# Patient Record
Sex: MALE | Race: ASIAN | Employment: FULL TIME | URBAN - METROPOLITAN AREA
[De-identification: names, ages, dates, MRNs, and addresses within clinical notes are randomized per-mention and may not be internally consistent; named-entity substitution may affect disease eponyms.]

---

## 2017-12-27 ENCOUNTER — OFFICE VISIT (OUTPATIENT)
Dept: FAMILY MEDICINE | Facility: CLINIC | Age: 42
End: 2017-12-27
Payer: COMMERCIAL

## 2017-12-27 VITALS
HEART RATE: 61 BPM | TEMPERATURE: 96.3 F | HEIGHT: 70 IN | BODY MASS INDEX: 20.93 KG/M2 | DIASTOLIC BLOOD PRESSURE: 68 MMHG | WEIGHT: 146.2 LBS | SYSTOLIC BLOOD PRESSURE: 110 MMHG

## 2017-12-27 DIAGNOSIS — Z00.00 ENCOUNTER FOR ROUTINE ADULT HEALTH EXAMINATION WITHOUT ABNORMAL FINDINGS: ICD-10-CM

## 2017-12-27 DIAGNOSIS — Z13.6 CARDIOVASCULAR SCREENING; LDL GOAL LESS THAN 160: Primary | ICD-10-CM

## 2017-12-27 DIAGNOSIS — R22.1 MASS OF NECK: ICD-10-CM

## 2017-12-27 LAB
ANION GAP SERPL CALCULATED.3IONS-SCNC: 9 MMOL/L (ref 3–14)
BUN SERPL-MCNC: 13 MG/DL (ref 7–30)
CALCIUM SERPL-MCNC: 9.1 MG/DL (ref 8.5–10.1)
CHLORIDE SERPL-SCNC: 105 MMOL/L (ref 94–109)
CHOLEST SERPL-MCNC: 211 MG/DL
CO2 SERPL-SCNC: 24 MMOL/L (ref 20–32)
CREAT SERPL-MCNC: 0.81 MG/DL (ref 0.66–1.25)
GFR SERPL CREATININE-BSD FRML MDRD: >90 ML/MIN/1.7M2
GLUCOSE SERPL-MCNC: 81 MG/DL (ref 70–99)
HDLC SERPL-MCNC: 65 MG/DL
LDLC SERPL CALC-MCNC: 127 MG/DL
NONHDLC SERPL-MCNC: 146 MG/DL
POTASSIUM SERPL-SCNC: 3.9 MMOL/L (ref 3.4–5.3)
SODIUM SERPL-SCNC: 138 MMOL/L (ref 133–144)
TRIGL SERPL-MCNC: 97 MG/DL

## 2017-12-27 PROCEDURE — 99396 PREV VISIT EST AGE 40-64: CPT | Performed by: PHYSICIAN ASSISTANT

## 2017-12-27 PROCEDURE — 80048 BASIC METABOLIC PNL TOTAL CA: CPT | Performed by: PHYSICIAN ASSISTANT

## 2017-12-27 PROCEDURE — 80061 LIPID PANEL: CPT | Performed by: PHYSICIAN ASSISTANT

## 2017-12-27 PROCEDURE — 36415 COLL VENOUS BLD VENIPUNCTURE: CPT | Performed by: PHYSICIAN ASSISTANT

## 2017-12-27 NOTE — MR AVS SNAPSHOT
After Visit Summary   12/27/2017    Nicholas Curiel    MRN: [id number]           Patient Information     Date Of Birth          1975        Visit Information        Provider Department      12/27/2017 7:00 AM Mir Lentz PA-C [facility]        Today's Diagnoses     CARDIOVASCULAR SCREENING; LDL GOAL LESS THAN 160    -  1    Encounter for routine adult health examination without abnormal findings          Care Instructions      Preventive Health Recommendations  Male Ages 40 to 49    Yearly exam:             See your health care provider every year in order to  o   Review health changes.   o   Discuss preventive care.    o   Review your medicines if your doctor has prescribed any.    You should be tested each year for STDs (sexually transmitted diseases) if you re at risk.     Have a cholesterol test every 5 years.     Have a colonoscopy (test for colon cancer) if someone in your family has had colon cancer or polyps before age 50.     After age 45, have a diabetes test (fasting glucose). If you are at risk for diabetes, you should have this test every 3 years.      Talk with your health care provider about whether or not a prostate cancer screening test (PSA) is right for you.    Shots: Get a flu shot each year. Get a tetanus shot every 10 years.     Nutrition:    Eat at least 5 servings of fruits and vegetables daily.     Eat whole-grain bread, whole-wheat pasta and brown rice instead of white grains and rice.     Talk to your provider about Calcium and Vitamin D.     Lifestyle    Exercise for at least 150 minutes a week (30 minutes a day, 5 days a week). This will help you control your weight and prevent disease.     Limit alcohol to one drink per day.     No smoking.     Wear sunscreen to prevent skin cancer.     See your dentist every six months for an exam and cleaning.              Follow-ups after your visit        Follow-up notes from your care team  "    Return in about 1 year (around 12/27/2018) for Physical Exam.      Who to contact     If you have questions or need follow up information about today's clinic visit or your schedule please contact [facility] [place] directly at [phone].  Normal or non-critical lab and imaging results will be communicated to you by MyChart, letter or phone within 4 business days after the clinic has received the results. If you do not hear from us within 7 days, please contact the clinic through [employer] or phone. If you have a critical or abnormal lab result, we will notify you by phone as soon as possible.  Submit refill requests through [employer] or call your pharmacy and they will forward the refill request to us. Please allow 3 business days for your refill to be completed.          Additional Information About Your Visit        People[employer] Information     [employer] gives you secure access to your electronic health record. If you see a primary care provider, you can also send messages to your care team and make appointments. If you have questions, please call your primary care clinic.  If you do not have a primary care provider, please call [phone] and they will assist you.        Care EveryWhere ID     This is your Care EveryWhere ID. This could be used by other organizations to access your [place] medical records  [id number]        Your Vitals Were     Pulse Temperature Height BMI (Body Mass Index)          61 96.3  F (35.7  C) 5' 10\" (1.778 m) 20.98 kg/m2         Blood Pressure from Last 3 Encounters:   12/27/17 110/68   09/09/16 102/60   09/01/15 104/70    Weight from Last 3 Encounters:   12/27/17 146 lb 3.2 oz (66.3 kg)   09/09/16 150 lb (68 kg)   09/01/15 151 lb (68.5 kg)              We Performed the Following     Basic metabolic panel     Lipid Profile (Chol, Trig, HDL, LDL calc)        Primary Care Provider Office Phone # Fax #    Javid Smith [phone]       [address] [place] [address]        Equal Access to Services     [facility] : Hadii jerome Wu, waevelin armas, amando jeter, manuel mortensen. So [facility] [phone].    ATENCIÓN: Si habla español, tiene a walker disposición servicios gratuitos de asistencia lingüística. Llame al [phone].    We comply with applicable federal civil rights laws and [place] laws. We do not discriminate on the basis of race, color, national origin, age, disability, sex, sexual orientation, or gender identity.            Thank you!     Thank you for choosing [facility] [place]  for your care. Our goal is always to provide you with excellent care. Hearing back from our patients is one way we can continue to improve our services. Please take a few minutes to complete the written survey that you may receive in the mail after your visit with us. Thank you!             Your Updated Medication List - Protect others around you: Learn how to safely use, store and throw away your medicines at www.disposemymeds.org.      Notice  As of 12/27/2017  7:34 AM    You have not been prescribed any medications.

## 2017-12-27 NOTE — PROGRESS NOTES
SUBJECTIVE:   CC: Nicholas Curiel is an 42 year old male who presents for preventative health visit.     Healthy Habits:    Do you get at least three servings of calcium containing foods daily (dairy, green leafy vegetables, etc.)? yes    Amount of exercise or daily activities, outside of work: 3 day(s) per week for 30 mintues    Problems taking medications regularly No    Medication side effects: No    Have you had an eye exam in the past two years? yes    Do you see a dentist twice per year? no - seen in [facility] 3 months ago     Do you have sleep apnea, excessive snoring or daytime drowsiness?no           PHQ-2 Score:     PHQ-2 ( 1999 Pf[employer]) 12/27/2017 9/9/2016   Q1: Little interest or pleasure in doing things 0 0   Q2: Feeling down, depressed or hopeless 0 0   PHQ-2 Score 0 0             Abuse: Current or Past(Physical, Sexual or Emotional)- No  Do you feel safe in your environment - Yes    Social History   Substance Use Topics     Smoking status: Never Smoker     Smokeless tobacco: Never Used     Alcohol use No      If you drink alcohol do you typically have >3 drinks per day or >7 drinks per week? No                      Last PSA: No results found for: PSA    Reviewed orders with patient. Reviewed health maintenance and updated orders accordingly - Yes  Patient Active Problem List   Diagnosis     CARDIOVASCULAR SCREENING; LDL GOAL LESS THAN 160     Past Surgical History:   Procedure Laterality Date     HC URETERAL STENT PLACE PERCUTANEOUS Left 1992       Social History   Substance Use Topics     Smoking status: Never Smoker     Smokeless tobacco: Never Used     Alcohol use No     History reviewed. No pertinent family history.      No current outpatient prescriptions on file.     No Known Allergies  Recent Labs   Lab Test  09/09/16   0937  09/01/15   0808   LDL  156*  137*   HDL  52  54   TRIG  98  87   ALT  41  42   CR  0.98  0.91   GFRESTIMATED  85  >90  Non  GFR Calc    "  GFRESTBLACK  >90   GFR Calc    >90   GFR Calc     POTASSIUM  4.3  4.0            Reviewed and updated as needed this visit by clinical staff  Allergies  Surg Moe Rosa         Reviewed and updated as needed this visit by Provider  Surg Moe Rosa        Past Medical History:   Diagnosis Date     CARDIOVASCULAR SCREENING; LDL GOAL LESS THAN 160      NO ACTIVE PROBLEMS       Past Surgical History:   Procedure Laterality Date     HC URETERAL STENT PLACE PERCUTANEOUS Left 1992       ROS:  C: NEGATIVE for fever, chills, change in weight  I: NEGATIVE for worrisome rashes, moles or lesions  E: NEGATIVE for vision changes or irritation  ENT: NEGATIVE for ear, mouth and throat problems  R: NEGATIVE for significant cough or SOB  CV: NEGATIVE for chest pain, palpitations or peripheral edema  GI: NEGATIVE for nausea, abdominal pain, heartburn, or change in bowel habits   male: negative for dysuria, hematuria, decreased urinary stream  M: NEGATIVE for significant arthralgias or myalgia  N: NEGATIVE for weakness, dizziness or paresthesias  P: NEGATIVE for changes in mood or affect    OBJECTIVE:   /68 (BP Location: Left arm, Patient Position: Chair, Cuff Size: Adult Regular)  Pulse 61  Temp 96.3  F (35.7  C)  Ht 5' 10\" (1.778 m)  Wt 146 lb 3.2 oz (66.3 kg)  BMI 20.98 kg/m2  EXAM:  GENERAL: healthy, alert and no distress  EYES: Eyes grossly normal to inspection, PERRL and conjunctivae and sclerae normal  HENT: 3-4 cm irregular firm right sided submandibular mass of neck noted, mass is immobile and non TTP  NECK: no adenopathy, no asymmetry, masses, or scars and thyroid normal to palpation  RESP: lungs clear to auscultation - no rales, rhonchi or wheezes  CV: regular rate and rhythm, normal S1 S2, no S3 or S4, no murmur, click or rub  ABDOMEN: soft, nontender, no hepatosplenomegaly, no masses and bowel sounds normal  MS: no gross musculoskeletal defects noted, no edema  SKIN: no " "suspicious lesions or rashes  NEURO: Normal strength and tone, mentation intact and speech normal  PSYCH: mentation appears normal, affect normal/bright    ASSESSMENT/PLAN:   1. Encounter for routine adult health examination without abnormal findings  - Basic metabolic panel    2. Mass of neck  3-4 cm irregular firm mass located along right submandibular region. He has had this looked at while in [place] and was advised to have a biopsy done, at this time he would like to continue to monitor this, declines biopsy and further workup at this time.  We discussed how further imaging is recommended, he is undrestanding of the risk of waiting.    3. CARDIOVASCULAR SCREENING; LDL GOAL LESS THAN 160  - Lipid Profile (Chol, Trig, HDL, LDL calc)    COUNSELING:  Reviewed preventive health counseling, as reflected in patient instructions       Regular exercise       Healthy diet/nutrition       reports that he has never smoked. He has never used smokeless tobacco.      Estimated body mass index is 20.98 kg/(m^2) as calculated from the following:    Height as of this encounter: 5' 10\" (1.778 m).    Weight as of this encounter: 146 lb 3.2 oz (66.3 kg).         Counseling Resources:  ATP IV Guidelines  Pooled Cohorts Equation Calculator  FRAX Risk Assessment  ICSI Preventive Guidelines  Dietary Guidelines for Americans, 2010  USDA's MyPlate  ASA Prophylaxis  Lung CA Screening    Mir Lentz PA-C  [facility] [place]  "

## 2017-12-28 NOTE — PROGRESS NOTES
Nicholas-  Here are your recent results.       -Kidney function is normal (Cr, GFR), Sodium is normal, Potassium is normal, Calcium is normal, Glucose is normal (diabetes screening test).   -LDL(bad) cholesterol level is elevated,  A diet high in fat and simple carbohydrates, genetics and being overweight can contribute to this. ADVISE: Exercise, a low fat, low carbohydrate diet and weight control are helpful to improve this.  Rechecking your fasting cholesterol panel in 12 months is recommended     If you have any questions please do not hesitate to contact our office via phone ([phone]) or you may send me a message via [employer] by clicking the contact my Care Team link.      It was a pleasure  participating in your care!    Thank you,    Mir Lentz MPH, PA-C  [address] [place] [address] [phone]

## 2018-03-05 ENCOUNTER — OFFICE VISIT (OUTPATIENT)
Dept: FAMILY MEDICINE | Facility: CLINIC | Age: 43
End: 2018-03-05
Payer: COMMERCIAL

## 2018-03-05 VITALS
OXYGEN SATURATION: 100 % | TEMPERATURE: 97.9 F | WEIGHT: 138 LBS | SYSTOLIC BLOOD PRESSURE: 106 MMHG | HEIGHT: 70 IN | HEART RATE: 68 BPM | BODY MASS INDEX: 19.76 KG/M2 | RESPIRATION RATE: 14 BRPM | DIASTOLIC BLOOD PRESSURE: 66 MMHG

## 2018-03-05 DIAGNOSIS — J20.9 ACUTE BRONCHITIS WITH SYMPTOMS > 10 DAYS: Primary | ICD-10-CM

## 2018-03-05 DIAGNOSIS — R05.9 COUGH: ICD-10-CM

## 2018-03-05 PROCEDURE — 99213 OFFICE O/P EST LOW 20 MIN: CPT | Performed by: FAMILY MEDICINE

## 2018-03-05 RX ORDER — CODEINE PHOSPHATE AND GUAIFENESIN 10; 100 MG/5ML; MG/5ML
1 SOLUTION ORAL EVERY 4 HOURS PRN
Qty: 120 ML | Refills: 0 | Status: SHIPPED | OUTPATIENT
Start: 2018-03-05 | End: 2021-07-01

## 2018-03-05 RX ORDER — AZITHROMYCIN 250 MG/1
TABLET, FILM COATED ORAL
Qty: 6 TABLET | Refills: 0 | Status: SHIPPED | OUTPATIENT
Start: 2018-03-05 | End: 2021-07-01

## 2018-03-05 NOTE — NURSING NOTE
"Chief Complaint   Patient presents with     URI       Initial /66 (Cuff Size: Adult Regular)  Pulse 68  Temp 97.9  F (36.6  C) (Tympanic)  Resp 14  Ht 5' 10\" (1.778 m)  Wt 138 lb (62.6 kg)  SpO2 100%  BMI 19.8 kg/m2 Estimated body mass index is 19.8 kg/(m^2) as calculated from the following:    Height as of this encounter: 5' 10\" (1.778 m).    Weight as of this encounter: 138 lb (62.6 kg).  Medication Reconciliation: complete   [place], CMA    "

## 2018-03-05 NOTE — PROGRESS NOTES
SUBJECTIVE:   Nicholas Curiel is a 42 year old male who presents to clinic today for the following health issues:      RESPIRATORY SYMPTOMS      Duration: 1 week     Description  sore throat, cough, headache and myalgias    Severity: moderate    Accompanying signs and symptoms: None    History (predisposing factors):  none    Precipitating or alleviating factors: None    Therapies tried and outcome:  Honey and bay leaf       Problem list and histories reviewed & adjusted, as indicated.  Additional history: as documented    Patient Active Problem List   Diagnosis     CARDIOVASCULAR SCREENING; LDL GOAL LESS THAN 160     Past Surgical History:   Procedure Laterality Date     HC URETERAL STENT PLACE PERCUTANEOUS Left 1992       Social History   Substance Use Topics     Smoking status: Never Smoker     Smokeless tobacco: Never Used     Alcohol use No     No family history on file.      Current Outpatient Prescriptions   Medication Sig Dispense Refill     azithromycin (ZITHROMAX) 250 MG tablet Two tablets first day, then one tablet daily for four days. 6 tablet 0     guaiFENesin-codeine (ROBITUSSIN AC) 100-10 MG/5ML SOLN solution Take 5 mLs by mouth every 4 hours as needed 120 mL 0     No Known Allergies  Recent Labs   Lab Test  12/27/17   0742  09/09/16   0937  09/01/15   0808   LDL  127*  156*  137*   HDL  65  52  54   TRIG  97  98  87   ALT   --   41  42   CR  0.81  0.98  0.91   GFRESTIMATED  >90  85  >90  Non  GFR Calc     GFRESTBLACK  >90  >90  African American GFR Calc    >90   GFR Calc     POTASSIUM  3.9  4.3  4.0      BP Readings from Last 3 Encounters:   03/05/18 106/66   12/27/17 110/68   09/09/16 102/60    Wt Readings from Last 3 Encounters:   03/05/18 138 lb (62.6 kg)   12/27/17 146 lb 3.2 oz (66.3 kg)   09/09/16 150 lb (68 kg)                    Reviewed and updated as needed this visit by clinical staff  Allergies  Meds       Reviewed and updated as needed this  "visit by Provider         ROS:  Constitutional, HEENT, cardiovascular, pulmonary, gi and gu systems are negative, except as otherwise noted.    OBJECTIVE:     /66 (Cuff Size: Adult Regular)  Pulse 68  Temp 97.9  F (36.6  C) (Tympanic)  Resp 14  Ht 5' 10\" (1.778 m)  Wt 138 lb (62.6 kg)  SpO2 100%  BMI 19.8 kg/m2  Body mass index is 19.8 kg/(m^2).  GENERAL: healthy, alert and no distress  NECK: no adenopathy, no asymmetry, masses, or scars and thyroid normal to palpation  RESP: lungs clear to auscultation - no rales, rhonchi or wheezes and expiratory wheezes throughout  CV: regular rate and rhythm, normal S1 S2, no S3 or S4, no murmur, click or rub, no peripheral edema and peripheral pulses strong  ABDOMEN: soft, nontender, no hepatosplenomegaly, no masses and bowel sounds normal  MS: no gross musculoskeletal defects noted, no edema        ASSESSMENT/PLAN:   Nicholas was seen today for uri.    Diagnoses and all orders for this visit:    Acute bronchitis with symptoms > 10 days  -     azithromycin (ZITHROMAX) 250 MG tablet; Two tablets first day, then one tablet daily for four days.    Cough  -     guaiFENesin-codeine (ROBITUSSIN AC) 100-10 MG/5ML SOLN solution; Take 5 mLs by mouth every 4 hours as needed      Hector Ruiz MD  [facility]    "

## 2018-03-05 NOTE — MR AVS SNAPSHOT
After Visit Summary   3/5/2018    Nicholas Curiel    MRN: [id number]           Patient Information     Date Of Birth          1975        Visit Information        Provider Department      3/5/2018 11:20 AM [facility]        Today's Diagnoses     Acute bronchitis with symptoms > 10 days    -  1    Cough           Follow-ups after your visit        Follow-up notes from your care team     Return in about 7 months (around 10/5/2018) for Physical Exam.      Who to contact     If you have questions or need follow up information about today's clinic visit or your schedule please contact [facility] [place] directly at [phone].  Normal or non-critical lab and imaging results will be communicated to you by MyChart, letter or phone within 4 business days after the clinic has received the results. If you do not hear from us within 7 days, please contact the clinic through [employer] or phone. If you have a critical or abnormal lab result, we will notify you by phone as soon as possible.  Submit refill requests through [employer] or call your pharmacy and they will forward the refill request to us. Please allow 3 business days for your refill to be completed.          Additional Information About Your Visit        MyChart Information     [employer] gives you secure access to your electronic health record. If you see a primary care provider, you can also send messages to your care team and make appointments. If you have questions, please call your primary care clinic.  If you do not have a primary care provider, please call [phone] and they will assist you.        Care EveryWhere ID     This is your Care EveryWhere ID. This could be used by other organizations to access your [place] medical records  [id number]        Your Vitals Were     Pulse Temperature Respirations Height Pulse Oximetry BMI (Body Mass Index)    68 97.9  F (36.6  C) (Tympanic) 14 5'  "10\" (1.778 m) 100% 19.8 kg/m2       Blood Pressure from Last 3 Encounters:   03/05/18 106/66   12/27/17 110/68   09/09/16 102/60    Weight from Last 3 Encounters:   03/05/18 138 lb (62.6 kg)   12/27/17 146 lb 3.2 oz (66.3 kg)   09/09/16 150 lb (68 kg)              Today, you had the following     No orders found for display         Today's Medication Changes          These changes are accurate as of 3/5/18 11:53 AM.  If you have any questions, ask your nurse or doctor.               Start taking these medicines.        Dose/Directions    azithromycin 250 MG tablet   Commonly known as:  ZITHROMAX   Used for:  Acute bronchitis with symptoms > 10 days   Started by:  Hector Ruiz MD        Two tablets first day, then one tablet daily for four days.   Quantity:  6 tablet   Refills:  0       guaiFENesin-codeine 100-10 MG/5ML Soln solution   Commonly known as:  ROBITUSSIN AC   Used for:  Cough   Started by:  Hector Ruiz MD        Dose:  1 tsp.   Take 5 mLs by mouth every 4 hours as needed   Quantity:  120 mL   Refills:  0            Where to get your medicines      These medications were sent to [facility] [id number]  [place], [place] - [address] [facility]  [address] [facility] [address]    Hours:  Added 10/26 CK Checked with pharmacy Phone:  [phone]     azithromycin 250 MG tablet         Some of these will need a paper prescription and others can be bought over the counter.  Ask your nurse if you have questions.     Bring a paper prescription for each of these medications     guaiFENesin-codeine 100-10 MG/5ML Soln solution                Primary Care Provider Office Phone # Fax #    Javid Smith [phone]       [address]  [place] [address]        Equal Access to Services     STARR GUZMAN AH: Malena Wu, waaxda luqadaha, qaybta kaalmada adeegyajavid, manuel mortensen. So [facility] [phone].    ATENCIÓN: Si alexis de leon " walker disposición servicios gratuitos de asistencia lingüística. Rajeev santana [phone].    We comply with applicable federal civil rights laws and [place] laws. We do not discriminate on the basis of race, color, national origin, age, disability, sex, sexual orientation, or gender identity.            Thank you!     Thank you for choosing [facility] [place]  for your care. Our goal is always to provide you with excellent care. Hearing back from our patients is one way we can continue to improve our services. Please take a few minutes to complete the written survey that you may receive in the mail after your visit with us. Thank you!             Your Updated Medication List - Protect others around you: Learn how to safely use, store and throw away your medicines at www.disposemymeds.org.          This list is accurate as of 3/5/18 11:53 AM.  Always use your most recent med list.                   Brand Name Dispense Instructions for use Diagnosis    azithromycin 250 MG tablet    ZITHROMAX    6 tablet    Two tablets first day, then one tablet daily for four days.    Acute bronchitis with symptoms > 10 days       guaiFENesin-codeine 100-10 MG/5ML Soln solution    ROBITUSSIN AC    120 mL    Take 5 mLs by mouth every 4 hours as needed    Cough

## 2020-03-02 ENCOUNTER — HEALTH MAINTENANCE LETTER (OUTPATIENT)
Age: 45
End: 2020-03-02

## 2020-12-20 ENCOUNTER — HEALTH MAINTENANCE LETTER (OUTPATIENT)
Age: 45
End: 2020-12-20

## 2021-04-24 ENCOUNTER — HEALTH MAINTENANCE LETTER (OUTPATIENT)
Age: 46
End: 2021-04-24

## 2021-06-24 DIAGNOSIS — Z13.6 CARDIOVASCULAR SCREENING; LDL GOAL LESS THAN 160: Primary | ICD-10-CM

## 2021-07-01 ENCOUNTER — OFFICE VISIT (OUTPATIENT)
Dept: CARDIOLOGY | Facility: CLINIC | Age: 46
End: 2021-07-01
Payer: COMMERCIAL

## 2021-07-01 VITALS
SYSTOLIC BLOOD PRESSURE: 118 MMHG | WEIGHT: 151.8 LBS | OXYGEN SATURATION: 100 % | HEART RATE: 58 BPM | DIASTOLIC BLOOD PRESSURE: 72 MMHG | BODY MASS INDEX: 21.25 KG/M2 | HEIGHT: 71 IN

## 2021-07-01 DIAGNOSIS — Z13.6 CARDIOVASCULAR SCREENING; LDL GOAL LESS THAN 160: ICD-10-CM

## 2021-07-01 DIAGNOSIS — Z13.6 CARDIOVASCULAR SCREENING; LDL GOAL LESS THAN 160: Primary | ICD-10-CM

## 2021-07-01 LAB
ANION GAP SERPL CALCULATED.3IONS-SCNC: 8 MMOL/L (ref 3–14)
BUN SERPL-MCNC: 12 MG/DL (ref 7–30)
CALCIUM SERPL-MCNC: 8.9 MG/DL (ref 8.5–10.1)
CHLORIDE SERPL-SCNC: 108 MMOL/L (ref 94–109)
CHOLEST SERPL-MCNC: 220 MG/DL
CO2 SERPL-SCNC: 24 MMOL/L (ref 20–32)
CREAT SERPL-MCNC: 0.89 MG/DL (ref 0.66–1.25)
CREAT UR-MCNC: 93 MG/DL
CRP SERPL HS-MCNC: 1.7 MG/L
GFR SERPL CREATININE-BSD FRML MDRD: >90 ML/MIN/{1.73_M2}
GLUCOSE SERPL-MCNC: 87 MG/DL (ref 70–99)
GLUCOSE SERPL-MCNC: 87 MG/DL (ref 70–99)
HDLC SERPL-MCNC: 58 MG/DL
INTERPRETATION ECG - MUSE: NORMAL
LDLC SERPL CALC-MCNC: 144 MG/DL
MICROALBUMIN UR-MCNC: 14 MG/L
MICROALBUMIN/CREAT UR: 15.25 MG/G CR (ref 0–17)
NONHDLC SERPL-MCNC: 162 MG/DL
NT-PROBNP SERPL-MCNC: 6 PG/ML (ref 0–125)
POTASSIUM SERPL-SCNC: 4.2 MMOL/L (ref 3.4–5.3)
SODIUM SERPL-SCNC: 140 MMOL/L (ref 133–144)
TRIGL SERPL-MCNC: 90 MG/DL

## 2021-07-01 PROCEDURE — 93922 UPR/L XTREMITY ART 2 LEVELS: CPT | Performed by: NURSE PRACTITIONER

## 2021-07-01 PROCEDURE — 82043 UR ALBUMIN QUANTITATIVE: CPT | Performed by: PATHOLOGY

## 2021-07-01 PROCEDURE — 36415 COLL VENOUS BLD VENIPUNCTURE: CPT | Performed by: PATHOLOGY

## 2021-07-01 PROCEDURE — 80061 LIPID PANEL: CPT | Performed by: PATHOLOGY

## 2021-07-01 PROCEDURE — 82947 ASSAY GLUCOSE BLOOD QUANT: CPT | Performed by: PATHOLOGY

## 2021-07-01 PROCEDURE — 86141 C-REACTIVE PROTEIN HS: CPT | Performed by: PATHOLOGY

## 2021-07-01 PROCEDURE — 99215 OFFICE O/P EST HI 40 MIN: CPT | Mod: 25 | Performed by: NURSE PRACTITIONER

## 2021-07-01 PROCEDURE — 80048 BASIC METABOLIC PNL TOTAL CA: CPT | Performed by: PATHOLOGY

## 2021-07-01 PROCEDURE — 83880 ASSAY OF NATRIURETIC PEPTIDE: CPT | Performed by: PATHOLOGY

## 2021-07-01 ASSESSMENT — MIFFLIN-ST. JEOR: SCORE: 1597.75

## 2021-07-01 NOTE — PROGRESS NOTES
[facility] for Cardiovascular Disease Prevention - Exam Note    Active Problems   Patient Active Problem List    Diagnosis Date Noted     CARDIOVASCULAR SCREENING; LDL GOAL LESS THAN 160 08/27/2014     Priority: Medium       Reason For Visit   Patient here for [facility] early detection of atherosclerosis and CVD exam.    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  He brought a nuclear medicine scan from 2018 that reveals left kidney has 39% of function, slightly smaller than right mildly impaired parenchymal tracer uptake.  Right kidney has 61% of renal function. He also had a stress test in [place] in 2018 and he stated that he was told the results were within normal range.  His primary care provider has been Dr. Jill Chong from [facility]. He is requesting a  [employer] recommendation for primary care.  His cholesterol levels have been > 200 for 5-6 years but he has never taken a statin medication.  Today in clinic he denies chest pain at rest, with activity or while resting, SOB at rest, with activity or while sleeping, palpations, lightheadedness, erectile dysfunction, ankle edema, calf pain, headaches or indigestion.                                                                                                                                                                                                                                                                                                                                                                                              Nutrition assessment per patient report:   Foods with fat/cholesterol (fried foods, fatty meats, junk food):  0 servings   Fruits and vegetables (  cup cooked, 1 cup raw):  2 servings of cooked vegetables/day, 2  servings of fruit/day  Caffeine (1 cup coffee, soda, etc):  4 cups of Kuwaiti coffee/day (mostly milk)  Alcohol servings (12 oz. beer, 4 oz. wine, 1  oz. in mixed drink):  0 servings  Calcium servings (dairy foods, 8 oz. milk, yogurt, cheese, ice cream):  He likes to eat ice cream in the evening  Drinks 2% milk in coffee  Salt/sodium use:  moderate  Special dietary habits:  does not eat meat since 2018   He sleeps 6 hours/night.  He knows that he should sleep 7-8 hours but often stays up later watching [employer].  He feels better the next day if he sleeps 7-8 hours    Activity  Patient is active 3-4 times per week, yoga 2x/week for 60 minutes, running 1-2x/week for 2 1/2 miles; 45 minutes    Laboratory Results Review  We discussed laboratory results today including lipids targets and how foods influence cholesterol.    Weight  His perceived healthy weight is 150-155 pounds.  A normal BMI of 25 is equal to 176 pounds.  The current BMI of 21.47 is normal weight range.      PMH   Past Medical History:   Diagnosis Date     CARDIOVASCULAR SCREENING; LDL GOAL LESS THAN 160      NO ACTIVE PROBLEMS        PSH  Past Surgical History:   Procedure Laterality Date     HC URETERAL STENT PLACE PERCUTANEOUS Left 1992       Current Meds   No current outpatient medications on file.       Allergies    No Known Allergies    Family Hx   Family History   Problem Relation Age of Onset     Other - See Comments Mother         bedridden after accident, more than 10 years     Kidney Disease Father         dialysis     Multiple myeloma Father        Social History  Nicholas is an  and is working full time. His job is [employer]. 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     Enjoyment of life is 7 with 10 being enjoys life fully.    Tobacco History  History   Smoking Status     Never Smoker   Smokeless Tobacco     Never Used       ROS  CONSTITUTIONAL:  No fever, chills, or sweats. No weight gain/loss.  "  EENT:  No visual disturbance, ear ache, epistaxis, sore throat  ALLERGIES/IMMUNOLOGIC:  Negative  RESPIRATORY:  No cough, hemoptysis  CARDIOVASCULAR:  As per HPI  GI:  No nausea, vomiting, hematemesis, melena  :  No urinary frequency, dysuria, or hematuria  INTEGUMENT:  Negative  PSYCHIATRIC:  Negative  NEURO:  Negative  ENDOCRINE:  Negative  MUSCULOSKELETAL:  Negative     Vital Signs   /72 (BP Location: Right arm, Patient Position: Sitting, Cuff Size: Adult Regular)   Pulse 58   Ht 1.791 m (5' 10.5\")   Wt 68.9 kg (151 lb 12.8 oz)   SpO2 100%   BMI 21.47 kg/m        Waist: 35 inches  Hip: 37 inches    Physical Exam   In general, the patient is a pleasant male in no apparent distress   HEENT: NC/AT, PERRLA, EOMI, sclerae white, not injected. Nares clear, pharynx without erythema or exudate, dentition intact    Neck: No adenopathy, no thyromegaly, carotids +4/4 bilaterally without bruits,  no jugular venous distension   Lungs: Breath sounds clear bilaterally, without crackles, ronchi, or wheezes  Cor: RRR, S1S2 without murmur, rub, click, or gallop, the PMI is in the 5th ICS in the midclavicular line  Abdomen: Soft, nontender, nondistended, BS+ in all 4 quadrants, without hepatomegaly, no aorta or renal artery bruits  Extremities: No clubbing, cyanosis, or edema. DP and PT pulses +2 bilaterally    The 10-year ASCVD risk score (Madiha EFFIE Jr., et al., 2013) is: 1.5%  Values used to calculate the score:   Age: 45 year old   Sex: male   Is Non- : No   Diabetic: No   Tobacco smoker: No   Systolic Blood Press: 125 mmHg   Is BP treated: No   HDL Cholesterol: 58 mg/dL   Total Cholesterol: 220 mg/dL    Recent Labs  Lab Results   Component Value Date    GLC 87 07/01/2021      Lab Results   Component Value Date    NTBNP 6 07/01/2021     No results found for: NTBNPI   Lab Results   Component Value Date    UCRR 93 07/01/2021      Lab Results   Component Value Date    MICROL 14 07/01/2021    "   No results found for: MICROALBUMIN   No results found for: CRP   Lab Results   Component Value Date    CHOL 220 (H) 07/01/2021      Lab Results   Component Value Date    TRIG 90 07/01/2021      Lab Results   Component Value Date    HDL 58 07/01/2021      Lab Results   Component Value Date     (H) 07/01/2021      Lab Results   Component Value Date    VLDL 17 09/01/2015      Lab Results   Component Value Date    CHOLHDLRATIO 3.9 09/01/2015     Lab Results   Component Value Date    NHDL 162 (H) 07/01/2021        Assessment:     Cardiovascular:  Asymptomatic, currently not complaining of chest pain, discussed ordering a coronar artery calcium score.  His CAC score was 0    Blood Pressure:  118-125/72-82, he does not take BP medication    Lipids:  He does not take a statin medication  Results for JAS CHAVEZ NO MI (MRN [id number]) as of 7/1/2021 09:50   Ref. Range 9/1/2015 08:08 9/9/2016 09:37 12/27/2017 07:42 7/1/2021 07:32   Cholesterol Latest Ref Range: <200 mg/dL 208 (H) 228 (H) 211 (H) 220 (H)   Cholesterol/HDL Ratio Latest Ref Range: 0.0 - 5.0  3.9      HDL Cholesterol Latest Ref Range: >39 mg/dL 54 52 65 58   LDL Cholesterol Calculated Latest Ref Range: <100 mg/dL 137 (H) 156 (H) 127 (H) 144 (H)   Non HDL Cholesterol Latest Ref Range: <130 mg/dL  176 (H) 146 (H) 162 (H)   N-Terminal Pro Bnp Latest Ref Range: 0 - 125 pg/mL    6   Triglycerides Latest Ref Range: <150 mg/dL 87 98 97 90   VLDL-Cholesterol Latest Ref Range: 0 - 30 mg/dL 17          Glucose: 87    Weight Management: BMI 21.47    He also requested a new PCP within [facility].  We referred him to Dr. Chao Bee at the [facility] for primary care.    Return to Clinic: 5 years    Health Habit Summary:  Nutrition: Heart Healthy Eating:  most of the time   Exercise:  regularly active  Weight:  normal weight range  Tobacco Use:  never used    Full report to follow prevention team review of test results with scanned final report.    Time  spent for patient visit was 60 minutes with more than half the time spent on counseling and coordination of care.    NIMA Muñoz CNP       CC  Patient Care Team:  Carole Masterson APRN CNP as PCP - General (Cardiovascular Disease)  SELF, REFERRED

## 2021-07-01 NOTE — LETTER
Date:July 28, 2021      Patient was self referred, no letter generated. Do not send.        [facility] Health Information

## 2021-07-01 NOTE — PROGRESS NOTES
[facility] Test Results    WALKING BLOOD PRESSURE RESPONSE (3 minute, 5 MET level walk)   Pre BP: 100/60 mmHg  3 min BP: 110/60 mmHg  1 min post BP: 100/70 mmHg    Pre HR: 60 bpm  3 min HR: 80 bpm  1 min post HR: 62 bpm       ABDOMINAL AORTA ULTRASOUND (< 2.5 normal, borderline 2.5-2.9, abnormal > 3)   SupraIliac 1.58 cm    SupraRenal 1.50 cm    InfraRenal Proximal 1.55 cm    InfraRenal Distal 1.68 cm      Abdominal Aorta Assessment:  normal    LEFT VENTRICULAR ULTRASOUND MEASUREMENTS (adjusted for BSA)  LVIDD 40.1 mm   Septa 8.4 mm   Posterior 7.7 mm     Left Ventricular US Assessment:  normal    Carotid Artery IMT measurements report and plaques in the small area examined:   Left IMT 0.527 mm  Plaques none    Right IMT 0.578 mm  Plaques none     ECG (see tracing):  normal sinus rhythm;  rate: 58 bpm    Arterial Elasticity per age and gender (see printout):   C1 22.5 mL/mmHg x 10  normal   C2 11.6 mL/mmHg x 100 normal   Supine blood pressure: 117/69 mmHg     Breanne Fish

## 2021-07-01 NOTE — LETTER
7/1/2021      RE: Nicholas Curiel  [id number]  [place] [address]       Dear Colleague,    Thank you for the opportunity to participate in the care of your patient, Nicholas Curiel, at the [facility] FOR CARDIOVASCULAR DISEASE PREVENT[facility]. Please see a copy of my visit note below.      [facility] Cardiovascular Disease Prevention - Exam Note    Active Problems   Patient Active Problem List    Diagnosis Date Noted     CARDIOVASCULAR SCREENING; LDL GOAL LESS THAN 160 08/27/2014     Priority: Medium       Reason For Visit   Patient here for [facility] early detection of atherosclerosis and CVD exam.    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  He brought a nuclear medicine scan from 2018 that reveals left kidney has 39% of function, slightly smaller than right mildly impaired parenchymal tracer uptake.  Right kidney has 61% of renal function. He also had a stress test in [place] in 2018 and he stated that he was told the results were within normal range.  His primary care provider has been Dr. Jill Chong from [facility]. He is requesting a [facility]/ recommendation for primary care.  His cholesterol levels have been > 200 for 5-6 years but he has never taken a statin medication.  Today in clinic he denies chest pain at rest, with activity or while resting, SOB at rest, with activity or while sleeping, palpations, lightheadedness, erectile dysfunction, ankle edema, calf pain, headaches or indigestion.                                                                                                                                                                                                                                                                                                                                                                                               Nutrition assessment per patient report:   Foods with fat/cholesterol (fried foods, fatty meats, junk food):  0 servings   Fruits and vegetables (  cup cooked, 1 cup raw):  2 servings of cooked vegetables/day, 2 servings of fruit/day  Caffeine (1 cup coffee, soda, etc):  4 cups of Dutch coffee/day (mostly milk)  Alcohol servings (12 oz. beer, 4 oz. wine, 1  oz. in mixed drink):  0 servings  Calcium servings (dairy foods, 8 oz. milk, yogurt, cheese, ice cream):  He likes to eat ice cream in the evening  Drinks 2% milk in coffee  Salt/sodium use:  moderate  Special dietary habits:  does not eat meat since 2018   He sleeps 6 hours/night.  He knows that he should sleep 7-8 hours but often stays up later watching [employer].  He feels better the next day if he sleeps 7-8 hours    Activity  Patient is active 3-4 times per week, yoga 2x/week for 60 minutes, running 1-2x/week for 2 1/2 miles; 45 minutes    Laboratory Results Review  We discussed laboratory results today including lipids targets and how foods influence cholesterol.    Weight  His perceived healthy weight is 150-155 pounds.  A normal BMI of 25 is equal to 176 pounds.  The current BMI of 21.47 is normal weight range.      PMH   Past Medical History:   Diagnosis Date     CARDIOVASCULAR SCREENING; LDL GOAL LESS THAN 160      NO ACTIVE PROBLEMS        PSH  Past Surgical History:   Procedure Laterality Date     HC URETERAL STENT PLACE PERCUTANEOUS Left 1992       Current Meds   No current outpatient medications on file.       Allergies    No Known Allergies    Family Hx   Family History   Problem Relation Age of Onset     Other - See Comments Mother         bedridden after accident, more than 10 years     Kidney Disease Father         dialysis     Multiple myeloma  "Father        Social History  Nicholas is an  and is working full time. His job is [employer]. He works at [employer] in the [employer] department  He is  2 children.     Enjoyment of life is 7 with 10 being enjoys life fully.    Tobacco History  History   Smoking Status     Never Smoker   Smokeless Tobacco     Never Used       ROS  CONSTITUTIONAL:  No fever, chills, or sweats. No weight gain/loss.   EENT:  No visual disturbance, ear ache, epistaxis, sore throat  ALLERGIES/IMMUNOLOGIC:  Negative  RESPIRATORY:  No cough, hemoptysis  CARDIOVASCULAR:  As per HPI  GI:  No nausea, vomiting, hematemesis, melena  :  No urinary frequency, dysuria, or hematuria  INTEGUMENT:  Negative  PSYCHIATRIC:  Negative  NEURO:  Negative  ENDOCRINE:  Negative  MUSCULOSKELETAL:  Negative     Vital Signs   /72 (BP Location: Right arm, Patient Position: Sitting, Cuff Size: Adult Regular)   Pulse 58   Ht 1.791 m (5' 10.5\")   Wt 68.9 kg (151 lb 12.8 oz)   SpO2 100%   BMI 21.47 kg/m        Waist: 35 inches  Hip: 37 inches    Physical Exam   In general, the patient is a pleasant male in no apparent distress   HEENT: NC/AT, PERRLA, EOMI, sclerae white, not injected. Nares clear, pharynx without erythema or exudate, dentition intact    Neck: No adenopathy, no thyromegaly, carotids +4/4 bilaterally without bruits,  no jugular venous distension   Lungs: Breath sounds clear bilaterally, without crackles, ronchi, or wheezes  Cor: RRR, S1S2 without murmur, rub, click, or gallop, the PMI is in the 5th ICS in the midclavicular line  Abdomen: Soft, nontender, nondistended, BS+ in all 4 quadrants, without hepatomegaly, no aorta or renal artery bruits  Extremities: No clubbing, cyanosis, or edema. DP and PT pulses +2 bilaterally    The 10-year ASCVD risk score (Fults EFFIE Jr., et al., 2013) is: 1.5%  Values used to calculate the score:   Age: 45 year old   Sex: male   Is Non- : No   Diabetic: No   Tobacco smoker: " No   Systolic Blood Press: 125 mmHg   Is BP treated: No   HDL Cholesterol: 58 mg/dL   Total Cholesterol: 220 mg/dL    Recent Labs  Lab Results   Component Value Date    GLC 87 07/01/2021      Lab Results   Component Value Date    NTBNP 6 07/01/2021     No results found for: NTBNPI   Lab Results   Component Value Date    UCRR 93 07/01/2021      Lab Results   Component Value Date    MICROL 14 07/01/2021      No results found for: MICROALBUMIN   No results found for: CRP   Lab Results   Component Value Date    CHOL 220 (H) 07/01/2021      Lab Results   Component Value Date    TRIG 90 07/01/2021      Lab Results   Component Value Date    HDL 58 07/01/2021      Lab Results   Component Value Date     (H) 07/01/2021      Lab Results   Component Value Date    VLDL 17 09/01/2015      Lab Results   Component Value Date    CHOLHDLRATIO 3.9 09/01/2015     Lab Results   Component Value Date    NHDL 162 (H) 07/01/2021        Assessment:     Cardiovascular:  Asymptomatic, currently not complaining of chest pain, discussed ordering a coronar artery calcium score.  His CAC score was 0    Blood Pressure:  118-125/72-82, he does not take BP medication    Lipids:  He does not take a statin medication  Results for JAS CHAVEZ NO MI (MRN [id number]) as of 7/1/2021 09:50   Ref. Range 9/1/2015 08:08 9/9/2016 09:37 12/27/2017 07:42 7/1/2021 07:32   Cholesterol Latest Ref Range: <200 mg/dL 208 (H) 228 (H) 211 (H) 220 (H)   Cholesterol/HDL Ratio Latest Ref Range: 0.0 - 5.0  3.9      HDL Cholesterol Latest Ref Range: >39 mg/dL 54 52 65 58   LDL Cholesterol Calculated Latest Ref Range: <100 mg/dL 137 (H) 156 (H) 127 (H) 144 (H)   Non HDL Cholesterol Latest Ref Range: <130 mg/dL  176 (H) 146 (H) 162 (H)   N-Terminal Pro Bnp Latest Ref Range: 0 - 125 pg/mL    6   Triglycerides Latest Ref Range: <150 mg/dL 87 98 97 90   VLDL-Cholesterol Latest Ref Range: 0 - 30 mg/dL 17          Glucose: 87    Weight Management: BMI  21.47    He also requested a new PCP within [facility].  We referred him to Dr. Chao Bee at the [facility] for primary care.    Return to Clinic: 5 years    Health Habit Summary:  Nutrition: Heart Healthy Eating:  most of the time   Exercise:  regularly active  Weight:  normal weight range  Tobacco Use:  never used    Full report to follow prevention team review of test results with scanned final report.    Time spent for patient visit was 60 minutes with more than half the time spent on counseling and coordination of care.    NIMA Muñoz CNP       CC  Patient Care Team:  Carole Masterson APRN CNP as PCP - General (Cardiovascular Disease)  SELF, REFERRED    [facility] Test Results    WALKING BLOOD PRESSURE RESPONSE (3 minute, 5 MET level walk)   Pre BP: 100/60 mmHg  3 min BP: 110/60 mmHg  1 min post BP: 100/70 mmHg    Pre HR: 60 bpm  3 min HR: 80 bpm  1 min post HR: 62 bpm       ABDOMINAL AORTA ULTRASOUND (< 2.5 normal, borderline 2.5-2.9, abnormal > 3)   SupraIliac 1.58 cm    SupraRenal 1.50 cm    InfraRenal Proximal 1.55 cm    InfraRenal Distal 1.68 cm      Abdominal Aorta Assessment:  normal    LEFT VENTRICULAR ULTRASOUND MEASUREMENTS (adjusted for BSA)  LVIDD 40.1 mm   Septa 8.4 mm   Posterior 7.7 mm     Left Ventricular US Assessment:  normal    Carotid Artery IMT measurements report and plaques in the small area examined:   Left IMT 0.527 mm  Plaques none    Right IMT 0.578 mm  Plaques none     ECG (see tracing):  normal sinus rhythm;  rate: 58 bpm    Arterial Elasticity per age and gender (see printout):   C1 22.5 mL/mmHg x 10  normal   C2 11.6 mL/mmHg x 100 normal   Supine blood pressure: 117/69 mmHg     Breanne Fish      Please do not hesitate to contact me if you have any questions/concerns.     Sincerely,     NIMA Muñoz CNP

## 2021-07-02 ENCOUNTER — HOSPITAL ENCOUNTER (OUTPATIENT)
Dept: CARDIOLOGY | Facility: CLINIC | Age: 46
Discharge: HOME | End: 2021-07-02
Attending: NURSE PRACTITIONER | Admitting: NURSE PRACTITIONER
Payer: COMMERCIAL

## 2021-07-02 DIAGNOSIS — Z13.6 CARDIOVASCULAR SCREENING; LDL GOAL LESS THAN 160: ICD-10-CM

## 2021-07-02 PROCEDURE — 75571 CT HRT W/O DYE W/CA TEST: CPT | Mod: 26 | Performed by: INTERNAL MEDICINE

## 2021-07-02 PROCEDURE — 75571 CT HRT W/O DYE W/CA TEST: CPT | Mod: GA

## 2021-10-03 ENCOUNTER — HEALTH MAINTENANCE LETTER (OUTPATIENT)
Age: 46
End: 2021-10-03

## 2022-05-15 ENCOUNTER — HEALTH MAINTENANCE LETTER (OUTPATIENT)
Age: 47
End: 2022-05-15

## 2022-09-10 ENCOUNTER — HEALTH MAINTENANCE LETTER (OUTPATIENT)
Age: 47
End: 2022-09-10

## 2023-06-03 ENCOUNTER — HEALTH MAINTENANCE LETTER (OUTPATIENT)
Age: 48
End: 2023-06-03

## 2024-07-07 ENCOUNTER — HEALTH MAINTENANCE LETTER (OUTPATIENT)
Age: 49
End: 2024-07-07

## 2025-09-03 ENCOUNTER — LAB REQUISITION (OUTPATIENT)
Dept: LAB | Facility: CLINIC | Age: 50
End: 2025-09-03

## 2025-09-03 DIAGNOSIS — H60.392 OTHER INFECTIVE OTITIS EXTERNA, LEFT EAR: ICD-10-CM

## 2025-09-04 LAB
BACTERIA ABSC ANAEROBE+AEROBE CULT: NORMAL
BACTERIA SPEC CULT: NORMAL
BACTERIA SPEC CULT: NORMAL